# Patient Record
Sex: MALE | Race: WHITE | NOT HISPANIC OR LATINO | URBAN - METROPOLITAN AREA
[De-identification: names, ages, dates, MRNs, and addresses within clinical notes are randomized per-mention and may not be internally consistent; named-entity substitution may affect disease eponyms.]

---

## 2017-01-06 NOTE — PATIENT DISCUSSION
(I76.1230) Primary open-angle glaucoma bilateral moderate stage - Assesment : Examination revealed Primary Open Angle Glaucoma:  IOP improved on Simbrinza - Plan : Monitor. Continue Latanoprost qhs OU and Timolol 0.5% OU bid. Continue Simbrinza OD tid. RTC in 2-3 months for IOP check, sooner if problems or changes occur.

## 2017-01-06 NOTE — PATIENT DISCUSSION
(H01.015) Ulcerative blepharitis left lower eyelid - Assesment : Examination revealed Blepharitis. - Plan : Warm soaks with massage to eyelid two times daily. Recommended returning for [address] Discussed treatment and answered questions. Pt wishes to proceed. Schedule Lid Hygiene Treatment OU with Grace Churchill.

## 2017-01-06 NOTE — PATIENT DISCUSSION
(H01.012) Ulcerative blepharitis right lower eyelid - Assesment : Examination revealed Blepharitis. - Plan : Warm soaks with massage to eyelid two times daily. Recommended returning for [address] Discussed treatment and answered questions. Pt wishes to proceed. Schedule Lid Hygiene Treatment OU with Leslie Humphries.

## 2017-01-19 NOTE — PATIENT DISCUSSION
(H01.015) Ulcerative blepharitis left lower eyelid - Assesment : Examination revealed Blepharitis. - Plan : see plan #1.

## 2017-01-19 NOTE — PATIENT DISCUSSION
(H01.012) Ulcerative blepharitis right lower eyelid - Assesment : Examination revealed Blepharitis. - Plan : Lid Hygiene treatment/meibomian gland expression done today in minor surgery. Monitor for changes. Pt to call with increased symptoms or decreased vision. Santosh mask OU QD. Blephadex foam OU QD. Avenova spray OU QD. AT's PRN. Continue Glaucoma drops per last instructions- RTC as sched in 3 MOS for TN CHECK. RTC 2-3 WKS for F/U on Blepharitis.

## 2017-02-15 NOTE — PATIENT DISCUSSION
(H01.015) Ulcerative blepharitis left lower eyelid - Assesment : Examination revealed Blepharitis. s/p Lid Hygiene treatment/meibomian gland expression. Doing well. - Plan : Advised Pt Lid Hygiene treatment/meibomian gland expression can be repeated if symptoms worsen. Continue lid hygiene routine with Santosh mask, Blephadex foam, and Avenova spray 2-3 times per week or increase prn if symptoms worsening. Recommended AT's daily 2-3 times or prn.

## 2017-02-15 NOTE — PATIENT DISCUSSION
(Q43.9071) Primary open-angle glaucoma bilateral moderate stage - Assesment : Examination revealed Primary Open Angle Glaucoma:  IOP stable. Pt will be going for DL soon and will need refraction. - Plan : Monitor. Continue Latanoprost qhs OU, Timolol 0.5% OU bid, and Simbrinza OD tid. RTC as scheduled in March for IOP check and REFRACTION, sooner if problems or changes occur.

## 2017-02-15 NOTE — PATIENT DISCUSSION
(H01.012) Ulcerative blepharitis right lower eyelid - Assesment : Examination revealed Blepharitis. s/p Lid Hygiene treatment/meibomian gland expression. Doing well. - Plan : Advised Pt Lid Hygiene treatment/meibomian gland expression can be repeated if symptoms worsen. Continue lid hygiene routine with Santosh mask, Blephadex foam, and Avenova spray 2-3 times per week or increase prn if symptoms worsening. Recommended AT's daily 2-3 times or prn.

## 2017-03-06 NOTE — PATIENT DISCUSSION
(O00.8903) Primary open-angle glaucoma bilateral moderate stage - Assesment : Examination revealed Primary Open Angle Glaucoma. IOP stable today. Refraction and DMV form completed today. Pt aware needs GLRx for driving. - Plan : Monitor. Continue Latanoprost qhs OU, Timolol 0.5% OU bid, and Simbrinza OD tid. RTC in 4-5 months for Exam and OCT ONH, sooner if problems or changes occur.

## 2017-07-18 NOTE — PATIENT DISCUSSION
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 Pt follows with Dr. Leslee Mckenzie, next appt this afternoon. - Plan : Continue following with Dr. Leslee Mckenzie as scheduled.

## 2017-07-18 NOTE — PATIENT DISCUSSION
(G39.2123) Primary open-angle glaucoma bilateral moderate stage - Assesment : Examination revealed Primary Open Angle Glaucoma. IOP and OCT ONH stable today. - Plan : Monitor for IOP and NFL changes with visits and testing. Continue Latanoprost qhs OU, Timolol 0.5% OU bid, and Simbrinza OD tid. RTC in 6 months for IOP Check and OCT ONH, sooner if problems, changes, or Dr. Sahil Barnett requests sooner.

## 2017-07-18 NOTE — PATIENT DISCUSSION
(I55.743) Keratoconjunct sicca, not specified as Sjogren's, bilateral - Assesment : Examination revealed Dry Eye Syndrome - Plan : Monitor for changes. ATs recommended at least qhs and qam. Systane Balance sample given today.

## 2017-08-21 NOTE — PATIENT DISCUSSION
(O96.1691) Primary open-angle glaucoma bilateral moderate stage - Assesment : Examination revealed Primary Open Angle Glaucoma. IOP elevated OD today. IOP 24 / 15 today. Dr. Jim Landers referred Pt as IOP was 30 OD at appt on Friday. Letter reviewed from Dr. Jim Landers. s/p SLT OD 10/31/16. - Plan : Monitor for IOP and NFL changes with visits and testing. Advised Pt of elevated IOP and importance of reducing IOP to protect form further vision loss. Recommended MP3 Laser OD. R/B/A's discussed, including risk of IOP not improving and possibility of repeating if needed. All questions answered and MP3 handout given. Pt wishes to proceed. Continue Latanoprost qhs OU, Timolol 0.5% OU bid, and Simbrinza OD tid. Schedule MP3 Laser OD.

## 2017-08-24 NOTE — PATIENT DISCUSSION
(L10.3545) Primary open-angle glaucoma bilateral moderate stage - Assesment : Examination revealed Primary Open Angle Glaucoma. s/p MP3 Laser OD 1 day PO. IOP with improvement OD today. IOP 15 / 16.   s/p SLT OD 10/31/16. Next appt with Dr. Jeane Severe next week. - Plan : Monitor for IOP and NFL changes with visits and testing. Advised Pt of IOP. Continue Latanoprost qhs OU, Timolol 0.5% OU bid, and Simbrinza OD tid. Continue Prednisolone qid OD for 1 week, then bid OD 1 week, then stop. Call with any pain, vision changes, or other concerns. Continue following with  as scheduled. RTC here in 1 month for IOP Check, sooner if problems.

## 2017-09-21 NOTE — PATIENT DISCUSSION
(I11.8238) Primary open-angle glaucoma bilateral moderate stage - Assesment : Examination revealed Primary Open Angle Glaucoma. s/p MP3 Laser OD- 1 month PO. IOP improved OD today. s/p SLT OD 10/31/16. Next appt with Dr. Mike Kolb next week. - Plan : Monitor for IOP and NFL changes with visits and testing. Continue Latanoprost qhs OU, Timolol 0.5% OU bid, and Simbrinza OD tid. Continue following with  as scheduled. RTC here in 4 month for IOP Check and OCT ONH, sooner if problems.

## 2018-01-09 NOTE — PATIENT DISCUSSION
(V05.978) Keratoconjunct sicca, not specified as Sjogren's, bilateral - Assesment : Examination revealed Dry Eye Syndrome  Pt reports uses ATs 3-4 times per day and when gets up in the night. - Plan : Monitor for changes. Recommended increasing ATs to q 1-2 hours while awake.

## 2018-01-09 NOTE — PATIENT DISCUSSION
(M54.9266) Primary open-angle glaucoma bilateral moderate stage - Assesment : Examination revealed Primary Open Angle Glaucoma. IOP 15/14 today. s/p MP3 Laser OD 8/23/17. s/p SLT OD 10/31/16. Pt follows with Dr. Manpreet Hoyos. - Plan : Monitor for IOP and NFL changes with visits and testing. Continue Latanoprost qhs OU, Timolol 0.5% OU bid, and Simbrinza OD tid. Continue following with  as scheduled. RTC here in 4-6 month for IOP Check and OCT ONH, sooner if problems.

## 2018-03-26 NOTE — PROCEDURE NOTE: CLINICAL
PROCEDURE NOTE: Lucentis 0.5mg PFS OS. Diagnosis: Neovascular AMD with Active CNV. Anesthesia: Topical/Subconjunctival. Prep: Betadine Flush. Prior to injection, risks/benefits/alternatives discussed including but not limited to infection, loss of vision or eye, hemorrhage, cataract, glaucoma, retinal tears or detachment. The patient wished to proceed with treatment. Topical anesthesia was induced with Alcaine. Additional anesthesia was achieved using drop(s) or injection checked above. A drop of Povidone-iodine 5% ophthalmic solution was instilled over the injection site and in the inferior fornix. Betadine prep was performed. A single use prefilled syringe of intravitreal Lucentis 0.5mg/0.05ml was used and excess discarded. The needle was passed 3.0 mm posterior to the limbus in pseudophakic patients, and 3.5 mm posterior to the limbus in phakic patients. The remainder of the Lucentis 0.5mg in the single-use vial was then discarded in a medical waste disposal container. The eye was irrigated with sterile irrigating solution. Patient tolerated the procedure well. There were no complications. Post procedure instructions given. CF vision checked. Injection Time 2:14PM. The patient was instructed to return for re-evaluation in approximately 4-12 weeks depending on his/her condition and was told to call immediately if vision decreases and/or if his/her eye becomes red, painful, and/or light sensitive. The patient was instructed to go to the emergency room or call 911 if unable to reach the doctor within an hour or two of trying or calling. Esha Oates

## 2018-05-21 NOTE — PROCEDURE NOTE: CLINICAL
PROCEDURE NOTE: Lucentis 0.5mg PFS OS. Diagnosis: Neovascular AMD with Active CNV. Anesthesia: Topical/Subconjunctival. Prep: Betadine Flush. Prior to injection, risks/benefits/alternatives discussed including but not limited to infection, loss of vision or eye, hemorrhage, cataract, glaucoma, retinal tears or detachment. The patient wished to proceed with treatment. Topical anesthesia was induced with Alcaine. Additional anesthesia was achieved using drop(s) or injection checked above. A drop of Povidone-iodine 5% ophthalmic solution was instilled over the injection site and in the inferior fornix. Betadine prep was performed. A single use prefilled syringe of intravitreal Lucentis 0.5mg/0.05ml was used and excess discarded. The needle was passed 3.0 mm posterior to the limbus in pseudophakic patients, and 3.5 mm posterior to the limbus in phakic patients. The remainder of the Lucentis 0.5mg in the single-use vial was then discarded in a medical waste disposal container. The eye was irrigated with sterile irrigating solution. Patient tolerated the procedure well. There were no complications. Post procedure instructions given. CF vision checked. Injection Time 240P. The patient was instructed to return for re-evaluation in approximately 4-12 weeks depending on his/her condition and was told to call immediately if vision decreases and/or if his/her eye becomes red, painful, and/or light sensitive. The patient was instructed to go to the emergency room or call 911 if unable to reach the doctor within an hour or two of trying or calling. Julia Arteaga

## 2018-06-07 NOTE — PATIENT DISCUSSION
(G36.880) Keratoconjunct sicca, not specified as Sjogren's, bilateral - Assesment : Examination revealed Dry Eye Syndrome  Pt reports uses ATs 3-4 times per day and when gets up in the night. - Plan : Monitor for changes. Recommended increasing ATs to q 1-2 hours while awake.  Try gel tears qhs and sample of Refesh titus-3

## 2018-06-07 NOTE — PATIENT DISCUSSION
(O78.4678) Primary open-angle glaucoma bilateral moderate stage - Assesment : Examination revealed Primary Open Angle Glaucoma. IOP stable OU today. s/p MP3 Laser OD 8/23/17. s/p SLT OD 10/31/16. Pt follows with Dr. Thomas Quiroga. - Plan : Monitor for IOP and NFL changes with visits and testing. Continue Latanoprost qhs OU, Timolol 0.5% OU bid, and Simbrinza OD tid. Continue following with  as scheduled. RTC 4-6 month for exam and OCT ONH, sooner if problems.

## 2018-07-10 NOTE — PROCEDURE NOTE: CLINICAL
PROCEDURE NOTE: Lucentis 0.5mg PFS OS. Diagnosis: Neovascular AMD with Active CNV. Anesthesia: Topical/Subconjunctival. Prep: Betadine Flush. Prior to injection, risks/benefits/alternatives discussed including but not limited to infection, loss of vision or eye, hemorrhage, cataract, glaucoma, retinal tears or detachment. The patient wished to proceed with treatment. Topical anesthesia was induced with Alcaine. Additional anesthesia was achieved using drop(s) or injection checked above. A drop of Povidone-iodine 5% ophthalmic solution was instilled over the injection site and in the inferior fornix. Betadine prep was performed. A single use prefilled syringe of intravitreal Lucentis 0.5mg/0.05ml was used and excess discarded. The needle was passed 3.0 mm posterior to the limbus in pseudophakic patients, and 3.5 mm posterior to the limbus in phakic patients. The remainder of the Lucentis 0.5mg in the single-use vial was then discarded in a medical waste disposal container. The eye was irrigated with sterile irrigating solution. Patient tolerated the procedure well. There were no complications. Post procedure instructions given. CF vision checked. Injection Time *. The patient was instructed to return for re-evaluation in approximately 4-12 weeks depending on his/her condition and was told to call immediately if vision decreases and/or if his/her eye becomes red, painful, and/or light sensitive. The patient was instructed to go to the emergency room or call 911 if unable to reach the doctor within an hour or two of trying or calling. Abe Mills

## 2018-08-22 NOTE — PROCEDURE NOTE: CLINICAL
PROCEDURE NOTE: Lucentis 0.5mg PFS OS. Diagnosis: Neovascular AMD with Active CNV. Anesthesia: Topical. Prep: Betadine Flush. Prior to injection, risks/benefits/alternatives discussed including but not limited to infection, loss of vision or eye, hemorrhage, cataract, glaucoma, retinal tears or detachment. The patient wished to proceed with treatment. Topical anesthesia was induced with Alcaine. Additional anesthesia was achieved using drop(s) or injection checked above. A drop of Povidone-iodine 5% ophthalmic solution was instilled over the injection site and in the inferior fornix. Betadine prep was performed. A single use prefilled syringe of intravitreal Lucentis 0.5mg/0.05ml was used and excess discarded. The needle was passed 3.0 mm posterior to the limbus in pseudophakic patients, and 3.5 mm posterior to the limbus in phakic patients. The remainder of the Lucentis 0.5mg in the single-use vial was then discarded in a medical waste disposal container. The eye was irrigated with sterile irrigating solution. Patient tolerated the procedure well. There were no complications. Post procedure instructions given. CF vision checked. Injection Time 3:43PM. The patient was instructed to return for re-evaluation in approximately 4-12 weeks depending on his/her condition and was told to call immediately if vision decreases and/or if his/her eye becomes red, painful, and/or light sensitive. The patient was instructed to go to the emergency room or call 911 if unable to reach the doctor within an hour or two of trying or calling. Nav Kaufman

## 2018-09-26 NOTE — PROCEDURE NOTE: CLINICAL

## 2018-10-17 NOTE — PATIENT DISCUSSION
(K44.4109) Primary open-angle glaucoma bilateral moderate stage - Assesment : Examination revealed Primary Open Angle Glaucoma. IOP OD elevated today,stable OS. Per patient and patients wife  and  stopped some of his glaucoma drops secondary to swelling in the retina and emphysema(latanaprost stopped OD and continued OS and Timolol OU stopped)  Per patient he has a breathing problems,pt uncertain if breathing improved or worsened while on Timolol Importance of keeping IOP low discussed with patient. Advised patient Timolol does cause breathing problems, recommend restarting to see if breathing is affected if its is patient to stop immediately Discussed with patient Latanprost can cause swelling in the retina Option of surgical intervention discussed with patient but advised if he proceeds he will need clearance prior from PCP for the anesthesia  s/p MP3 Laser OD 8/23/17. s/p SLT OD 10/31/16. Pt follows with Dr. Aurelio Hashimoto and  - Plan : Monitor for IOP and NFL changes with visits and testing. Continue Latanoprost qhs OS stop OD secondary to retinal edema,  Timolol 0.5%  QD OD and Stop OS(pt to stop Timolol OD if breathing issues worsen), Simbrinza OD TID and BID OS.   Written instructions given to patient and patients wife Rtc 2 weeks ignacio pepe

## 2018-10-30 NOTE — PATIENT DISCUSSION
(Z04.9440) Primary open-angle glaucoma bilateral moderate stage - Assesment : Examination revealed Primary Open Angle Glaucoma. IOP OD improved today. Per Pt currently taking Timolol Od qdaily, Latanoprost OS qhs, Simbrinza OD tid and OS bid, and Ketorolac OD qdaily. Stopped Latanoprost OD secondary to retinal edema. s/p MP3 Laser OD 8/23/17. s/p SLT OD 10/31/16. Pt follows with Dr. Rosa Maria Silva and  - Plan : Monitor for IOP and NFL changes with visits and testing. Continue Timolol Od qdaily, Latanoprost OS qhs, Simbrinza OD tid and OS bid, and Ketorolac OD qdaily. Continue following with Dr. Rosa Maria Silva and Dr. Umer Ladd as scheduled. RTC in 6 months IOP check and OCT ONH, sooner if problems or changes.

## 2018-10-31 NOTE — PROCEDURE NOTE: CLINICAL
PROCEDURE NOTE: Lucentis 0.5mg PFS OS. Diagnosis: Neovascular AMD with Active CNV. Anesthesia: Lidocaine 4%. Prep: Betadine Flush. Prior to injection, risks/benefits/alternatives discussed including but not limited to infection, loss of vision or eye, hemorrhage, cataract, glaucoma, retinal tears or detachment. The patient wished to proceed with treatment. Topical anesthesia was induced with Alcaine. Additional anesthesia was achieved using drop(s) or injection checked above. A drop of Povidone-iodine 5% ophthalmic solution was instilled over the injection site and in the inferior fornix. Betadine prep was performed. A single use prefilled syringe of intravitreal Lucentis 0.5mg/0.05ml was used and excess discarded. The needle was passed 3.0 mm posterior to the limbus in pseudophakic patients, and 3.5 mm posterior to the limbus in phakic patients. The remainder of the Lucentis 0.5mg in the single-use vial was then discarded in a medical waste disposal container. The eye was irrigated with sterile irrigating solution. Patient tolerated the procedure well. There were no complications. Post procedure instructions given. CF vision checked. Injection Time 3:45PM. The patient was instructed to return for re-evaluation in approximately 4-12 weeks depending on his/her condition and was told to call immediately if vision decreases and/or if his/her eye becomes red, painful, and/or light sensitive. The patient was instructed to go to the emergency room or call 911 if unable to reach the doctor within an hour or two of trying or calling. Facundo Rachel

## 2018-12-12 NOTE — PROCEDURE NOTE: CLINICAL
PROCEDURE NOTE: Lucentis 0.5mg PFS OS. Diagnosis: Neovascular AMD with Active CNV. Anesthesia: Topical. Prep: Betadine Flush. Prior to injection, risks/benefits/alternatives discussed including but not limited to infection, loss of vision or eye, hemorrhage, cataract, glaucoma, retinal tears or detachment. The patient wished to proceed with treatment. Topical anesthesia was induced with Alcaine. Additional anesthesia was achieved using drop(s) or injection checked above. A drop of Povidone-iodine 5% ophthalmic solution was instilled over the injection site and in the inferior fornix. Betadine prep was performed. A single use prefilled syringe of intravitreal Lucentis 0.5mg/0.05ml was used and excess discarded. The needle was passed 3.0 mm posterior to the limbus in pseudophakic patients, and 3.5 mm posterior to the limbus in phakic patients. The remainder of the Lucentis 0.5mg in the single-use vial was then discarded in a medical waste disposal container. The eye was irrigated with sterile irrigating solution. Patient tolerated the procedure well. There were no complications. Post procedure instructions given. CF vision checked. Injection Time 415PM. The patient was instructed to return for re-evaluation in approximately 4-12 weeks depending on his/her condition and was told to call immediately if vision decreases and/or if his/her eye becomes red, painful, and/or light sensitive. The patient was instructed to go to the emergency room or call 911 if unable to reach the doctor within an hour or two of trying or calling. Immanuel Chatterjee

## 2019-01-04 NOTE — PATIENT DISCUSSION
(A41.6264) Primary open-angle glaucoma bilateral moderate stage - Assesment : Examination revealed Primary Open Angle Glaucoma. IOP OS elevated today. Pt stopped Latanoprost and now taking Rhopressa OD QD, Timolol OU QD, and Simbrinza OD TID and OS BID, and Ketorolac OD qdaily. s/p MP3 Laser OD 8/23/17. s/p SLT OD 10/31/16. Pt follows with Dr. Concepcion Cerda and  - Plan : Monitor for IOP and NFL changes with visits and OCTs. Continue Rhopressa OD qdaily and now add to OS qdaily. Continue Timolol OU qdaily and Simbrinza OD tid and OS bid, and Ketorolac OD qdaily. Continue following with Dr. Concepcion Cerda and Dr. Aly Spence as scheduled. RTC in 2-3 weeks IOP check, sooner if problems or changes.

## 2019-01-16 NOTE — PROCEDURE NOTE: CLINICAL
PROCEDURE NOTE: Lucentis 0.5mg PFS OS. Diagnosis: Neovascular AMD with Active CNV. Anesthesia: Lidocaine 4%. Prep: Betadine Flush. Prior to injection, risks/benefits/alternatives discussed including but not limited to infection, loss of vision or eye, hemorrhage, cataract, glaucoma, retinal tears or detachment. The patient wished to proceed with treatment. Topical anesthesia was induced with Alcaine. Additional anesthesia was achieved using drop(s) or injection checked above. A drop of Povidone-iodine 5% ophthalmic solution was instilled over the injection site and in the inferior fornix. Betadine prep was performed. A single use prefilled syringe of intravitreal Lucentis 0.5mg/0.05ml was used and excess discarded. The needle was passed 3.0 mm posterior to the limbus in pseudophakic patients, and 3.5 mm posterior to the limbus in phakic patients. The remainder of the Lucentis 0.5mg in the single-use vial was then discarded in a medical waste disposal container. The eye was irrigated with sterile irrigating solution. Patient tolerated the procedure well. There were no complications. Post procedure instructions given. CF vision checked. Injection Time 4:15 PM. The patient was instructed to return for re-evaluation in approximately 4-12 weeks depending on his/her condition and was told to call immediately if vision decreases and/or if his/her eye becomes red, painful, and/or light sensitive. The patient was instructed to go to the emergency room or call 911 if unable to reach the doctor within an hour or two of trying or calling. Adarsh Hall

## 2019-02-27 NOTE — PROCEDURE NOTE: CLINICAL

## 2019-04-10 NOTE — PROCEDURE NOTE: CLINICAL

## 2019-05-15 NOTE — PROCEDURE NOTE: CLINICAL
PROCEDURE NOTE: Lucentis 0.5mg PFS OS. Diagnosis: Neovascular AMD with Active CNV. Anesthesia: Topical/Subconjunctival. Prep: Betadine Flush. Prior to injection, risks/benefits/alternatives discussed including but not limited to infection, loss of vision or eye, hemorrhage, cataract, glaucoma, retinal tears or detachment. The patient wished to proceed with treatment. Topical anesthesia was induced with Alcaine. Additional anesthesia was achieved using drop(s) or injection checked above. A drop of Povidone-iodine 5% ophthalmic solution was instilled over the injection site and in the inferior fornix. Betadine prep was performed. A single use prefilled syringe of intravitreal Lucentis 0.5mg/0.05ml was used and excess discarded. The needle was passed 3.0 mm posterior to the limbus in pseudophakic patients, and 3.5 mm posterior to the limbus in phakic patients. The remainder of the Lucentis 0.5mg in the single-use vial was then discarded in a medical waste disposal container. The eye was irrigated with sterile irrigating solution. Patient tolerated the procedure well. There were no complications. Post procedure instructions given. CF vision checked. Injection Time 1:54PM. The patient was instructed to return for re-evaluation in approximately 4-12 weeks depending on his/her condition and was told to call immediately if vision decreases and/or if his/her eye becomes red, painful, and/or light sensitive. The patient was instructed to go to the emergency room or call 911 if unable to reach the doctor within an hour or two of trying or calling. Adarsh Hall

## 2019-06-11 ENCOUNTER — IMPORTED ENCOUNTER (OUTPATIENT)
Dept: URBAN - METROPOLITAN AREA CLINIC 9 | Facility: CLINIC | Age: 59
End: 2019-06-11

## 2019-06-11 PROBLEM — H04.123: Noted: 2019-06-11

## 2019-06-11 PROBLEM — H11.041: Noted: 2019-06-11

## 2019-06-11 PROBLEM — H11.153: Noted: 2019-06-11

## 2019-07-01 NOTE — PROCEDURE NOTE: CLINICAL
PROCEDURE NOTE: Lucentis 0.5mg PFS OS. Diagnosis: Neovascular AMD with Active CNV. Anesthesia: Topical/Subconjunctival. Prep: Betadine Flush. Prior to injection, risks/benefits/alternatives discussed including but not limited to infection, loss of vision or eye, hemorrhage, cataract, glaucoma, retinal tears or detachment. The patient wished to proceed with treatment. Topical anesthesia was induced with Alcaine. Additional anesthesia was achieved using drop(s) or injection checked above. A drop of Povidone-iodine 5% ophthalmic solution was instilled over the injection site and in the inferior fornix. Betadine prep was performed. A single use prefilled syringe of intravitreal Lucentis 0.5mg/0.05ml was used and excess discarded. The needle was passed 3.0 mm posterior to the limbus in pseudophakic patients, and 3.5 mm posterior to the limbus in phakic patients. The remainder of the Lucentis 0.5mg in the single-use vial was then discarded in a medical waste disposal container. The eye was irrigated with sterile irrigating solution. Patient tolerated the procedure well. There were no complications. Post procedure instructions given. CF vision checked. Injection Time 202. The patient was instructed to return for re-evaluation in approximately 4-12 weeks depending on his/her condition and was told to call immediately if vision decreases and/or if his/her eye becomes red, painful, and/or light sensitive. The patient was instructed to go to the emergency room or call 911 if unable to reach the doctor within an hour or two of trying or calling. Aprli Rose

## 2019-08-07 NOTE — PROCEDURE NOTE: CLINICAL
PROCEDURE NOTE: Lucentis 0.5mg PFS OS. Diagnosis: Neovascular AMD with Active CNV. Anesthesia: Topical/Subconjunctival. Prep: Betadine Flush. Prior to injection, risks/benefits/alternatives discussed including but not limited to infection, loss of vision or eye, hemorrhage, cataract, glaucoma, retinal tears or detachment. The patient wished to proceed with treatment. Topical anesthesia was induced with Alcaine. Additional anesthesia was achieved using drop(s) or injection checked above. A drop of Povidone-iodine 5% ophthalmic solution was instilled over the injection site and in the inferior fornix. Betadine prep was performed. A single use prefilled syringe of intravitreal Lucentis 0.5mg/0.05ml was used and excess discarded. The needle was passed 3.0 mm posterior to the limbus in pseudophakic patients, and 3.5 mm posterior to the limbus in phakic patients. The remainder of the Lucentis 0.5mg in the single-use vial was then discarded in a medical waste disposal container. The eye was irrigated with sterile irrigating solution. Patient tolerated the procedure well. There were no complications. Post procedure instructions given. CF vision checked. Injection Time 1:55PM. The patient was instructed to return for re-evaluation in approximately 4-12 weeks depending on his/her condition and was told to call immediately if vision decreases and/or if his/her eye becomes red, painful, and/or light sensitive. The patient was instructed to go to the emergency room or call 911 if unable to reach the doctor within an hour or two of trying or calling. April Rose

## 2019-09-17 NOTE — PROCEDURE NOTE: CLINICAL
PROCEDURE NOTE: Lucentis 0.5mg PFS OS. Diagnosis: Neovascular AMD with Active CNV. Anesthesia: Topical/Subconjunctival. Prep: Betadine Flush. Prior to injection, risks/benefits/alternatives discussed including but not limited to infection, loss of vision or eye, hemorrhage, cataract, glaucoma, retinal tears or detachment. The patient wished to proceed with treatment. Topical anesthesia was induced with Alcaine. Additional anesthesia was achieved using drop(s) or injection checked above. A drop of Povidone-iodine 5% ophthalmic solution was instilled over the injection site and in the inferior fornix. Betadine prep was performed. A single use prefilled syringe of intravitreal Lucentis 0.5mg/0.05ml was used and excess discarded. The needle was passed 3.0 mm posterior to the limbus in pseudophakic patients, and 3.5 mm posterior to the limbus in phakic patients. The remainder of the Lucentis 0.5mg in the single-use vial was then discarded in a medical waste disposal container. The eye was irrigated with sterile irrigating solution. Patient tolerated the procedure well. There were no complications. Post procedure instructions given. CF vision checked. Injection Time 121. The patient was instructed to return for re-evaluation in approximately 4-12 weeks depending on his/her condition and was told to call immediately if vision decreases and/or if his/her eye becomes red, painful, and/or light sensitive. The patient was instructed to go to the emergency room or call 911 if unable to reach the doctor within an hour or two of trying or calling. Bharti Sierra

## 2019-11-13 NOTE — PROCEDURE NOTE: CLINICAL
PROCEDURE NOTE: Lucentis 0.5mg PFS OS. Diagnosis: Neovascular AMD with Active CNV. Anesthesia: Topical/Subconjunctival. Prep: Betadine Flush. Prior to injection, risks/benefits/alternatives discussed including but not limited to infection, loss of vision or eye, hemorrhage, cataract, glaucoma, retinal tears or detachment. The patient wished to proceed with treatment. Topical anesthesia was induced with Alcaine. Additional anesthesia was achieved using drop(s) or injection checked above. A drop of Povidone-iodine 5% ophthalmic solution was instilled over the injection site and in the inferior fornix. Betadine prep was performed. A single use prefilled syringe of intravitreal Lucentis 0.5mg/0.05ml was used and excess discarded. The needle was passed 3.0 mm posterior to the limbus in pseudophakic patients, and 3.5 mm posterior to the limbus in phakic patients. The remainder of the Lucentis 0.5mg in the single-use vial was then discarded in a medical waste disposal container. The eye was irrigated with sterile irrigating solution. Patient tolerated the procedure well. There were no complications. Post procedure instructions given. CF vision checked. Injection Time 129P. The patient was instructed to return for re-evaluation in approximately 4-12 weeks depending on his/her condition and was told to call immediately if vision decreases and/or if his/her eye becomes red, painful, and/or light sensitive. The patient was instructed to go to the emergency room or call 911 if unable to reach the doctor within an hour or two of trying or calling. Facundo Rachel

## 2019-12-16 NOTE — PROCEDURE NOTE: CLINICAL
PROCEDURE NOTE: Lucentis 0.5mg PFS OS. Diagnosis: Neovascular AMD with Active CNV. Anesthesia: Topical/Subconjunctival. Prep: Betadine Flush. Prior to injection, risks/benefits/alternatives discussed including but not limited to infection, loss of vision or eye, hemorrhage, cataract, glaucoma, retinal tears or detachment. The patient wished to proceed with treatment. Topical anesthesia was induced with Alcaine. Additional anesthesia was achieved using drop(s) or injection checked above. A drop of Povidone-iodine 5% ophthalmic solution was instilled over the injection site and in the inferior fornix. Betadine prep was performed. A single use prefilled syringe of intravitreal Lucentis 0.5mg/0.05ml was used and excess discarded. The needle was passed 3.0 mm posterior to the limbus in pseudophakic patients, and 3.5 mm posterior to the limbus in phakic patients. The remainder of the Lucentis 0.5mg in the single-use vial was then discarded in a medical waste disposal container. The eye was irrigated with sterile irrigating solution. Patient tolerated the procedure well. There were no complications. Post procedure instructions given. CF vision checked. Injection Time 843AM. The patient was instructed to return for re-evaluation in approximately 4-12 weeks depending on his/her condition and was told to call immediately if vision decreases and/or if his/her eye becomes red, painful, and/or light sensitive. The patient was instructed to go to the emergency room or call 911 if unable to reach the doctor within an hour or two of trying or calling. Espinoza Chavarria

## 2020-01-21 NOTE — PROCEDURE NOTE: CLINICAL
PROCEDURE NOTE: Eylea 2mg OD. Diagnosis: Neovascular AMD with Active CNV. Anesthesia: Lidocaine 4%. Prep: Betadine Drops. Prior to injection, risks/benefits/alternatives discussed including infection, loss of vision, hemorrhage, cataract, glaucoma, retinal tears or detachment. The patient wished to proceed with treatment. Betadine prep was performed. Topical anesthesia was induced with Alcaine. Additional anesthesia was achieved using drop(s) or injection checked above. A drop of Povidone-iodine 5% ophthalmic solution was instilled over the injection site and in the inferior fornix. Using the syringe provided, Eylea 2.0mg in 0.05 cc was injected into the vitreous cavity. The remainder of the Eylea in the single-use vial was then discarded in a medical waste disposal container. The needle was passed 3.0 mm posterior to the limbus in pseudophakic patients, and 3.5 mm posterior to the limbus in phakic patients. Patient tolerated procedure well. There were no complications. Injection time: 2:40 PM. The eye was irrigated with sterile irrigating solution. Post procedure instructions given. The patient was instructed to return for re-evaluation in approximately 4-12 weeks depending on his/her condition and was told to call immediately if vision decreases and/or if his/her eye becomes red, painful, and/or light sensitive. The patient was instructed to go to the emergency room or call 911 if unable to reach the doctor within an hour or two of trying or calling. The patient was instructed to use Artificial Tears q.i.d. p.r.n for comfort. Romero Courtney PROCEDURE NOTE: Lucentis 0.5mg PFS OS. Diagnosis: Neovascular AMD with Active CNV. Anesthesia: Lidocaine 4%. Prep: Betadine Flush. Prior to injection, risks/benefits/alternatives discussed including but not limited to infection, loss of vision or eye, hemorrhage, cataract, glaucoma, retinal tears or detachment. The patient wished to proceed with treatment. Topical anesthesia was induced with Alcaine. Additional anesthesia was achieved using drop(s) or injection checked above. A drop of Povidone-iodine 5% ophthalmic solution was instilled over the injection site and in the inferior fornix. Betadine prep was performed. A single use prefilled syringe of intravitreal Lucentis 0.5mg/0.05ml was used and excess was disposed of as waste. The needle was passed 3.0 mm posterior to the limbus in pseudophakic patients, and 3.5 mm posterior to the limbus in phakic patients. Injection Time 2:40 PM. Patient tolerated the procedure well. There were no complications. The eye was irrigated with sterile irrigating solution. Post procedure instructions given. The patient was instructed to use Artificial Tears q.i.d. p.r.n for comfort. The patient was instructed to return for re-evaluation in approximately 4-12 weeks depending on his/her condition and was told to call immediately if vision decreases and/or if his/her eye becomes red, painful, and/or light sensitive. The patient was instructed to go to the emergency room or call 911 if unable to reach the doctor within an hour or two of trying or calling. Romero Courtney

## 2020-02-26 NOTE — PROCEDURE NOTE: CLINICAL
PROCEDURE NOTE: Eylea PFS OD. Diagnosis: Neovascular AMD with Active CNV. Prior to injection, risks/benefits/alternatives discussed including but not limited to infection, loss of vision or eye, hemorrhage, cataract, glaucoma, retinal tears or detachment. The patient wished to proceed with treatment. Betadine prep was performed. Topical anesthesia was induced with Alcaine. Additional anesthesia was achieved using drop(s) or injection checked above. A drop of Povidone-iodine 5% ophthalmic solution was instilled over the injection site and in the inferior fornix. A single use prefilled syringe of intravitreal Eylea 2mg/0.05ml was used and excess was disposed of as waste. The needle was passed 3.0 mm posterior to the limbus in pseudophakic patients, and 3.5 mm posterior to the limbus in phakic patients. Injection time: 213P. Patient tolerated procedure well. There were no complications. The eye was irrigated with sterile irrigating solution. Post procedure instructions given. The patient was instructed to use Artificial Tears q.i.d. p.r.n for comfort. The patient was instructed to return for re-evaluation in approximately 4-12 weeks depending on his/her condition and was told to call immediately if vision decreases and/or if his/her eye becomes red, painful, and/or light sensitive. The patient was instructed to go to the emergency room or call 911 if unable to reach the doctor within an hour or two of trying or calling. Sebastián Lovett PROCEDURE NOTE: Lucentis 0.5mg PFS OS. Diagnosis: Neovascular AMD with Active CNV. Prior to injection, risks/benefits/alternatives discussed including but not limited to infection, loss of vision or eye, hemorrhage, cataract, glaucoma, retinal tears or detachment. The patient wished to proceed with treatment. Topical anesthesia was induced with Alcaine. Additional anesthesia was achieved using drop(s) or injection checked above. A drop of Povidone-iodine 5% ophthalmic solution was instilled over the injection site and in the inferior fornix. Betadine prep was performed. A single use prefilled syringe of intravitreal Lucentis 0.5mg/0.05ml was used and excess was disposed of as waste. The needle was passed 3.0 mm posterior to the limbus in pseudophakic patients, and 3.5 mm posterior to the limbus in phakic patients. Injection Time 213P. Patient tolerated the procedure well. There were no complications. The eye was irrigated with sterile irrigating solution. Post procedure instructions given. The patient was instructed to use Artificial Tears q.i.d. p.r.n for comfort. The patient was instructed to return for re-evaluation in approximately 4-12 weeks depending on his/her condition and was told to call immediately if vision decreases and/or if his/her eye becomes red, painful, and/or light sensitive. The patient was instructed to go to the emergency room or call 911 if unable to reach the doctor within an hour or two of trying or calling. Sebastián Lovett
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
## 2020-04-01 NOTE — PROCEDURE NOTE: CLINICAL
PROCEDURE NOTE: Eylea PFS OD. Diagnosis: Neovascular AMD with Active CNV. Prior to injection, risks/benefits/alternatives discussed including but not limited to infection, loss of vision or eye, hemorrhage, cataract, glaucoma, retinal tears or detachment. The patient wished to proceed with treatment. Betadine prep was performed. Topical anesthesia was induced with Alcaine. Additional anesthesia was achieved using drop(s) or injection checked above. A drop of Povidone-iodine 5% ophthalmic solution was instilled over the injection site and in the inferior fornix. A single use prefilled syringe of intravitreal Eylea 2mg/0.05ml was used and excess was disposed of as waste. The needle was passed 3.0 mm posterior to the limbus in pseudophakic patients, and 3.5 mm posterior to the limbus in phakic patients. Injection time: 143P. Patient tolerated procedure well. There were no complications. The eye was irrigated with sterile irrigating solution. Post procedure instructions given. The patient was instructed to use Artificial Tears q.i.d. p.r.n for comfort. The patient was instructed to return for re-evaluation in approximately 4-12 weeks depending on his/her condition and was told to call immediately if vision decreases and/or if his/her eye becomes red, painful, and/or light sensitive. The patient was instructed to go to the emergency room or call 911 if unable to reach the doctor within an hour or two of trying or calling. Julio Cesar Rodriguez PROCEDURE NOTE: Lucentis 0.5mg PFS OS. Diagnosis: Neovascular AMD with Active CNV. Prior to injection, risks/benefits/alternatives discussed including but not limited to infection, loss of vision or eye, hemorrhage, cataract, glaucoma, retinal tears or detachment. The patient wished to proceed with treatment. Topical anesthesia was induced with Alcaine. Additional anesthesia was achieved using drop(s) or injection checked above. A drop of Povidone-iodine 5% ophthalmic solution was instilled over the injection site and in the inferior fornix. Betadine prep was performed. A single use prefilled syringe of intravitreal Lucentis 0.5mg/0.05ml was used and excess was disposed of as waste. The needle was passed 3.0 mm posterior to the limbus in pseudophakic patients, and 3.5 mm posterior to the limbus in phakic patients. Injection Time 143P. Patient tolerated the procedure well. There were no complications. The eye was irrigated with sterile irrigating solution. Post procedure instructions given. The patient was instructed to use Artificial Tears q.i.d. p.r.n for comfort. The patient was instructed to return for re-evaluation in approximately 4-12 weeks depending on his/her condition and was told to call immediately if vision decreases and/or if his/her eye becomes red, painful, and/or light sensitive. The patient was instructed to go to the emergency room or call 911 if unable to reach the doctor within an hour or two of trying or calling. Julio Cesar Rodriguez

## 2020-05-06 NOTE — PROCEDURE NOTE: CLINICAL
PROCEDURE NOTE: Eylea PFS OD. Diagnosis: Neovascular AMD with Active CNV. Prior to injection, risks/benefits/alternatives discussed including but not limited to infection, loss of vision or eye, hemorrhage, cataract, glaucoma, retinal tears or detachment. The patient wished to proceed with treatment. Betadine prep was performed. Topical anesthesia was induced with Alcaine. Additional anesthesia was achieved using drop(s) or injection checked above. A drop of Povidone-iodine 5% ophthalmic solution was instilled over the injection site and in the inferior fornix. A single use prefilled syringe of intravitreal Eylea 2mg/0.05ml was used and excess was disposed of as waste. The needle was passed 3.0 mm posterior to the limbus in pseudophakic patients, and 3.5 mm posterior to the limbus in phakic patients. Injection time: 244PM.  Patient tolerated procedure well. There were no complications. The eye was irrigated with sterile irrigating solution. Post procedure instructions given. The patient was instructed to use Artificial Tears q.i.d. p.r.n for comfort. The patient was instructed to return for re-evaluation in approximately 4-12 weeks depending on his/her condition and was told to call immediately if vision decreases and/or if his/her eye becomes red, painful, and/or light sensitive. The patient was instructed to go to the emergency room or call 911 if unable to reach the doctor within an hour or two of trying or calling. Nav Kaufman PROCEDURE NOTE: Lucentis 0.5mg PFS OS. Diagnosis: Neovascular AMD with Active CNV. Prior to injection, risks/benefits/alternatives discussed including but not limited to infection, loss of vision or eye, hemorrhage, cataract, glaucoma, retinal tears or detachment. The patient wished to proceed with treatment. Topical anesthesia was induced with Alcaine. Additional anesthesia was achieved using drop(s) or injection checked above. A drop of Povidone-iodine 5% ophthalmic solution was instilled over the injection site and in the inferior fornix. Betadine prep was performed. A single use prefilled syringe of intravitreal Lucentis 0.5mg/0.05ml was used and excess was disposed of as waste. The needle was passed 3.0 mm posterior to the limbus in pseudophakic patients, and 3.5 mm posterior to the limbus in phakic patients. Injection Time 243PM. Patient tolerated the procedure well. There were no complications. The eye was irrigated with sterile irrigating solution. Post procedure instructions given. The patient was instructed to use Artificial Tears q.i.d. p.r.n for comfort. The patient was instructed to return for re-evaluation in approximately 4-12 weeks depending on his/her condition and was told to call immediately if vision decreases and/or if his/her eye becomes red, painful, and/or light sensitive. The patient was instructed to go to the emergency room or call 911 if unable to reach the doctor within an hour or two of trying or calling. Nav Kaufman

## 2020-06-10 NOTE — PROCEDURE NOTE: CLINICAL
PROCEDURE NOTE: Eylea PFS OD. Diagnosis: Neovascular AMD with Active CNV. Anesthesia: Topical. Prep: Betadine Drops. Prior to injection, risks/benefits/alternatives discussed including but not limited to infection, loss of vision or eye, hemorrhage, cataract, glaucoma, retinal tears or detachment. The patient wished to proceed with treatment. Betadine prep was performed. Topical anesthesia was induced with Alcaine. Additional anesthesia was achieved using drop(s) or injection checked above. A drop of Povidone-iodine 5% ophthalmic solution was instilled over the injection site and in the inferior fornix. A single use prefilled syringe of intravitreal Eylea 2mg/0.05ml was used and excess was disposed of as waste. The needle was passed 3.0 mm posterior to the limbus in pseudophakic patients, and 3.5 mm posterior to the limbus in phakic patients. Injection time: 3:40 PM.  Patient tolerated procedure well. There were no complications. The eye was irrigated with sterile irrigating solution. Post procedure instructions given. The patient was instructed to use Artificial Tears q.i.d. p.r.n for comfort. The patient was instructed to return for re-evaluation in approximately 4-12 weeks depending on his/her condition and was told to call immediately if vision decreases and/or if his/her eye becomes red, painful, and/or light sensitive. The patient was instructed to go to the emergency room or call 911 if unable to reach the doctor within an hour or two of trying or calling. [facility] PROCEDURE NOTE: Lucentis 0.5mg PFS OS. Diagnosis: Neovascular AMD with Active CNV. Anesthesia: Topical. Prep: Betadine Flush. Prior to injection, risks/benefits/alternatives discussed including but not limited to infection, loss of vision or eye, hemorrhage, cataract, glaucoma, retinal tears or detachment. The patient wished to proceed with treatment. Topical anesthesia was induced with Alcaine. Additional anesthesia was achieved using drop(s) or injection checked above. A drop of Povidone-iodine 5% ophthalmic solution was instilled over the injection site and in the inferior fornix. Betadine prep was performed. A single use prefilled syringe of intravitreal Lucentis 0.5mg/0.05ml was used and excess was disposed of as waste. The needle was passed 3.0 mm posterior to the limbus in pseudophakic patients, and 3.5 mm posterior to the limbus in phakic patients. Injection Time 3:40 PM. Patient tolerated the procedure well. There were no complications. The eye was irrigated with sterile irrigating solution. Post procedure instructions given. The patient was instructed to use Artificial Tears q.i.d. p.r.n for comfort. The patient was instructed to return for re-evaluation in approximately 4-12 weeks depending on his/her condition and was told to call immediately if vision decreases and/or if his/her eye becomes red, painful, and/or light sensitive. The patient was instructed to go to the emergency room or call 911 if unable to reach the doctor within an hour or two of trying or calling. Urbano Ventura

## 2020-06-10 NOTE — PROCEDURE NOTE: CLINICAL
PROCEDURE NOTE: Eylea PFS OD. Diagnosis: Neovascular AMD with Active CNV. Anesthesia: Topical. Prep: Betadine Drops. Prior to injection, risks/benefits/alternatives discussed including but not limited to infection, loss of vision or eye, hemorrhage, cataract, glaucoma, retinal tears or detachment. The patient wished to proceed with treatment. Betadine prep was performed. Topical anesthesia was induced with Alcaine. Additional anesthesia was achieved using drop(s) or injection checked above. A drop of Povidone-iodine 5% ophthalmic solution was instilled over the injection site and in the inferior fornix. A single use prefilled syringe of intravitreal Eylea 2mg/0.05ml was used and excess was disposed of as waste. The needle was passed 3.0 mm posterior to the limbus in pseudophakic patients, and 3.5 mm posterior to the limbus in phakic patients. Injection time: 3:40 PM.  Patient tolerated procedure well. There were no complications. The eye was irrigated with sterile irrigating solution. Post procedure instructions given. The patient was instructed to use Artificial Tears q.i.d. p.r.n for comfort. The patient was instructed to return for re-evaluation in approximately 4-12 weeks depending on his/her condition and was told to call immediately if vision decreases and/or if his/her eye becomes red, painful, and/or light sensitive. The patient was instructed to go to the emergency room or call 911 if unable to reach the doctor within an hour or two of trying or calling. [facility] PROCEDURE NOTE: Lucentis 0.5mg PFS OS. Diagnosis: Neovascular AMD with Active CNV. Anesthesia: Topical. Prep: Betadine Flush. Prior to injection, risks/benefits/alternatives discussed including but not limited to infection, loss of vision or eye, hemorrhage, cataract, glaucoma, retinal tears or detachment. The patient wished to proceed with treatment. Topical anesthesia was induced with Alcaine. Additional anesthesia was achieved using drop(s) or injection checked above. A drop of Povidone-iodine 5% ophthalmic solution was instilled over the injection site and in the inferior fornix. Betadine prep was performed. A single use prefilled syringe of intravitreal Lucentis 0.5mg/0.05ml was used and excess was disposed of as waste. The needle was passed 3.0 mm posterior to the limbus in pseudophakic patients, and 3.5 mm posterior to the limbus in phakic patients. Injection Time 3:40 PM. Patient tolerated the procedure well. There were no complications. The eye was irrigated with sterile irrigating solution. Post procedure instructions given. The patient was instructed to use Artificial Tears q.i.d. p.r.n for comfort. The patient was instructed to return for re-evaluation in approximately 4-12 weeks depending on his/her condition and was told to call immediately if vision decreases and/or if his/her eye becomes red, painful, and/or light sensitive. The patient was instructed to go to the emergency room or call 911 if unable to reach the doctor within an hour or two of trying or calling. Urbano Ventura

## 2020-06-13 NOTE — PROCEDURE NOTE: CLINICAL
PROCEDURE NOTE: A/C Paracentesis, Therapeutic OD. Diagnosis: POAG, Severe. Prior to procedure, risks/benefits/alternatives discussed including infection, loss of vision, hemorrhage, cataract, retinal tears or detachment and patient wished to proceed. All questions asked by the patient were answered in detail. Betadine prep was performed. Location of Paracentesis: Temporal Limbus. Volume of tap: 0. * ml. Patient tolerated procedure well. There were no complications. Anti-biotic drops were then instilled into the post-operative eye. Post procedure IOP = * mmHg. Post procedure instructions given. Patient given office phone number/answering service number and advised to call immediately should there be an increase in floaters or redness, loss of vision or pain, or should they have any other questions or concerns. PAPER CONSENT SIGNED SCANNED IN DOCUMENTS.

## 2020-06-13 NOTE — PATIENT DISCUSSION
6/13/20 WORSENED HYPHEMA, TRIED AC TAP, HOWEVER NO BLOOD WAS RETRIEVED, IOP ABOVE 60. CALLED DR KNUTSON , WILL OPEN OR TO TRY TO [address] AC OUT AND RELIEVE EYE PRESSURE.  PT WAS CALLED TO GO TO [place] [facility] AND GET SX .

## 2020-06-14 NOTE — PATIENT DISCUSSION
6/13/20 HAD AC 8 Maura Aguilar, COMMUNICATED WITH DR Kristine Peck AS IRIS WAS PUSHED FORWARD, RECOMMENDED  CORE PPV AS MALIGNANT GLAUCOMA COULD BE CAUSE, DR Kristine Peck PERFORMED PPV AND STATED AS SOON AS PPV WAS PERFORMED, CHAMBER DEEPENED AND IOP CONTROLLED.

## 2020-06-14 NOTE — PATIENT DISCUSSION
IOP ELEVATED TODAY, WOUND WAS BURPPED IOP LOWERED TO 7, B SCAN NO CHOROIDALS, NO RD, VIT HME, PT TO CONTINUE ON DIAMOX AND GLAUCOMA MEDS.

## 2020-06-15 NOTE — PATIENT DISCUSSION
POST INJECTION EVALUATION, no signs of new infection, tear, RD, VF, EOM, CNS, Vascular or other problems or side effect from previous injection(s). Alternatives Discussed Intro (Do Not Add Period): I discussed alternative treatments to Mohs surgery and specifically discussed the risks and benefits of

## 2020-06-15 NOTE — PATIENT DISCUSSION
6/15/20 IOP 21, NO PAIN [facility] RETESTED NLP, DISCUSSED WITH DR KNUTSON, STILL REASONABLE TO WASHOUT AC, ON COUMADIN, TO GET STAT INR FIRST THEN DECIDE ON HOLDING COUMADIN. HAS A NEW CARDIOLOGYST THAT HAS NOT SEEN.

## 2020-06-17 NOTE — PATIENT DISCUSSION
6/17/20 POST SECOND  AC WASHOUT DR KNUTSON 6/15/20(AND 6/13/20) , IOP CONTROLLED, HOLD DIAMOX, TO CONTINUE COUMADIN, INR WAS 2.3 YESTERDAY, VIEW OF AC MUCH MORE CLEAR WITH RBC, CORNEAL EDEMA, CHAMBER DEEP. CONTNUE DROPS.

## 2021-04-21 NOTE — PATIENT DISCUSSION
ARTIFICIAL TEARS to affected eye(s) as needed. Finasteride Male Counseling: Finasteride Counseling:  I discussed with the patient the risks of use of finasteride including but not limited to decreased libido, decreased ejaculate volume, gynecomastia, and depression. Women should not handle medication.  All of the patient's questions and concerns were addressed. Finasteride Counseling:  I discussed with the patient the risks of use of finasteride including but not limited to decreased libido, decreased ejaculate volume, gynecomastia, and depression. Women should not handle medication.  All of the patient's questions and concerns were addressed.

## 2021-10-16 ASSESSMENT — VISUAL ACUITY
OD_SC: 20/50 -2 SN
OS_SC: 20/50 SN

## 2021-10-16 ASSESSMENT — TONOMETRY
OS_IOP_MMHG: 11
OD_IOP_MMHG: 12

## 2022-01-06 ENCOUNTER — ESTABLISHED PATIENT (OUTPATIENT)
Dept: URBAN - NONMETROPOLITAN AREA CLINIC 6 | Facility: CLINIC | Age: 62
End: 2022-01-06

## 2022-01-06 DIAGNOSIS — H04.123: ICD-10-CM

## 2022-01-06 PROCEDURE — 92012 INTRM OPH EXAM EST PATIENT: CPT

## 2022-01-06 RX ORDER — OLOPATADINE HYDROCHLORIDE 2 MG/ML: 1 SOLUTION OPHTHALMIC AS NEEDED

## 2022-01-06 ASSESSMENT — VISUAL ACUITY
OS_SC: 20/40
OD_SC: 20/60+1

## 2022-02-10 ENCOUNTER — FOLLOW UP (OUTPATIENT)
Dept: URBAN - NONMETROPOLITAN AREA CLINIC 6 | Facility: CLINIC | Age: 62
End: 2022-02-10

## 2022-02-10 DIAGNOSIS — H04.123: ICD-10-CM

## 2022-02-10 PROCEDURE — 68761 CLOSE TEAR DUCT OPENING: CPT

## 2022-02-10 PROCEDURE — 99213 OFFICE O/P EST LOW 20 MIN: CPT

## 2022-02-10 ASSESSMENT — VISUAL ACUITY
OD_SC: 20/70
OU_SC: 20/40
OS_SC: 20/60

## 2022-05-04 NOTE — PATIENT DISCUSSION
(D44.8296) Primary open-angle glaucoma bilateral moderate stage - Assesment : Examination revealed Primary Open Angle Glaucoma. IOP improved today. s/p MP3 Laser OD 8/23/17. s/p SLT OD 10/31/16. Pt is considering Glaucoma Procedure with Dr. Noland Landau and has an appt scheduled next week. Pt follows with Dr. Danielle Brooks and  - Plan : Monitor for IOP and NFL changes with visits and OCTs. Continue Rhopressa OU qdaily, Timolol OU qdaily, and Simbrinza OD tid and OS bid, and Ketorolac OD qdaily. Written drop instructions given today. Continue following with Dr. Danielle Brooks and Dr. Noland Landau as scheduled. RTC in 3-4  months for IOP check and OCT ONH (sooner if recommended by Dr. Noland Landau), sooner if problems or changes. 1

## 2022-05-20 ENCOUNTER — FOLLOW UP (OUTPATIENT)
Dept: URBAN - NONMETROPOLITAN AREA CLINIC 6 | Facility: CLINIC | Age: 62
End: 2022-05-20

## 2022-05-20 DIAGNOSIS — H04.123: ICD-10-CM

## 2022-05-20 PROCEDURE — 99213 OFFICE O/P EST LOW 20 MIN: CPT

## 2022-05-20 RX ORDER — LIFITEGRAST 50 MG/ML: 1 SOLUTION/ DROPS OPHTHALMIC TWICE A DAY

## 2022-05-20 ASSESSMENT — VISUAL ACUITY
OD_SC: 20/60-1
OU_SC: 20/50
OS_SC: 20/50-1

## 2022-05-20 ASSESSMENT — TONOMETRY
OD_IOP_MMHG: 14
OS_IOP_MMHG: 14

## 2022-10-21 NOTE — PATIENT DISCUSSION
10/21/22: TRAUMA 2 DAYS AGO WITH SEVERE HEMIFACIAL HEMATOMA. NO FRACTURES ON CT SCAN.  NO EVIDENCE OF OPEN GLOBE (NO CLINICAL SUSPICION, NO CT FEATURES). HYPOTONY. TO CONT F/U BY DR THACKER.

## 2024-01-01 NOTE — PATIENT DISCUSSION
10/21/22: S/P TBC. SEEMS TO BE CHRONIC SINCE NO EVIDENCE OF CD.  MANAGED BY DR Philomena Wilhelm Green Ahsan. TO CONT. MD Office

## 2024-08-20 NOTE — PATIENT DISCUSSION
Retinal exam findings communicated to Physician managing diabetes. Note Text (......Xxx Chief Complaint.): This diagnosis correlates with the Other (Free Text): Neutragena/CeraVe/Vanicream/Amlactin OTC Detail Level: Zone Render Risk Assessment In Note?: no

## 2025-04-24 NOTE — PATIENT DISCUSSION
Discussed the importance of blood sugar and blood pressure control. cell phone/clothing 3 (mild pain)